# Patient Record
Sex: FEMALE | Race: WHITE | NOT HISPANIC OR LATINO | Employment: FULL TIME | URBAN - METROPOLITAN AREA
[De-identification: names, ages, dates, MRNs, and addresses within clinical notes are randomized per-mention and may not be internally consistent; named-entity substitution may affect disease eponyms.]

---

## 2021-03-12 ENCOUNTER — IMMUNIZATION (OUTPATIENT)
Dept: PRIMARY CARE CLINIC | Facility: CLINIC | Age: 21
End: 2021-03-12

## 2021-03-12 DIAGNOSIS — Z23 NEED FOR VACCINATION: Primary | ICD-10-CM

## 2021-03-12 PROCEDURE — 0001A PR IMMUNIZ ADMIN, SARS-COV-2 COVID-19 VACC, 30MCG/0.3ML, 1ST DOSE: CPT | Mod: CV19,S$GLB,, | Performed by: INTERNAL MEDICINE

## 2021-03-12 PROCEDURE — 0001A PR IMMUNIZ ADMIN, SARS-COV-2 COVID-19 VACC, 30MCG/0.3ML, 1ST DOSE: ICD-10-PCS | Mod: CV19,S$GLB,, | Performed by: INTERNAL MEDICINE

## 2021-03-12 PROCEDURE — 91300 PR SARS-COV- 2 COVID-19 VACCINE, NO PRSV, 30MCG/0.3ML, IM: ICD-10-PCS | Mod: S$GLB,,, | Performed by: INTERNAL MEDICINE

## 2021-03-12 PROCEDURE — 91300 PR SARS-COV- 2 COVID-19 VACCINE, NO PRSV, 30MCG/0.3ML, IM: CPT | Mod: S$GLB,,, | Performed by: INTERNAL MEDICINE

## 2021-03-12 RX ADMIN — Medication 0.3 ML: at 01:03

## 2021-04-02 ENCOUNTER — IMMUNIZATION (OUTPATIENT)
Dept: PRIMARY CARE CLINIC | Facility: CLINIC | Age: 21
End: 2021-04-02

## 2021-04-02 DIAGNOSIS — Z23 NEED FOR VACCINATION: Primary | ICD-10-CM

## 2021-04-02 PROCEDURE — 0002A PR IMMUNIZ ADMIN, SARS-COV-2 COVID-19 VACC, 30MCG/0.3ML, 2ND DOSE: CPT | Mod: CV19,S$GLB,, | Performed by: INTERNAL MEDICINE

## 2021-04-02 PROCEDURE — 0002A PR IMMUNIZ ADMIN, SARS-COV-2 COVID-19 VACC, 30MCG/0.3ML, 2ND DOSE: ICD-10-PCS | Mod: CV19,S$GLB,, | Performed by: INTERNAL MEDICINE

## 2021-04-02 PROCEDURE — 91300 PR SARS-COV- 2 COVID-19 VACCINE, NO PRSV, 30MCG/0.3ML, IM: ICD-10-PCS | Mod: S$GLB,,, | Performed by: INTERNAL MEDICINE

## 2021-04-02 PROCEDURE — 91300 PR SARS-COV- 2 COVID-19 VACCINE, NO PRSV, 30MCG/0.3ML, IM: CPT | Mod: S$GLB,,, | Performed by: INTERNAL MEDICINE

## 2021-04-02 RX ADMIN — Medication 0.3 ML: at 02:04

## 2023-03-07 ENCOUNTER — OFFICE VISIT (OUTPATIENT)
Dept: OBSTETRICS AND GYNECOLOGY | Facility: CLINIC | Age: 23
End: 2023-03-07
Payer: COMMERCIAL

## 2023-03-07 VITALS — DIASTOLIC BLOOD PRESSURE: 70 MMHG | SYSTOLIC BLOOD PRESSURE: 107 MMHG | WEIGHT: 107.38 LBS

## 2023-03-07 DIAGNOSIS — N39.0 RECURRENT UTI: ICD-10-CM

## 2023-03-07 DIAGNOSIS — Z11.3 SCREEN FOR STD (SEXUALLY TRANSMITTED DISEASE): ICD-10-CM

## 2023-03-07 DIAGNOSIS — R30.0 DYSURIA: Primary | ICD-10-CM

## 2023-03-07 PROCEDURE — 87086 URINE CULTURE/COLONY COUNT: CPT | Performed by: STUDENT IN AN ORGANIZED HEALTH CARE EDUCATION/TRAINING PROGRAM

## 2023-03-07 PROCEDURE — 99203 PR OFFICE/OUTPT VISIT, NEW, LEVL III, 30-44 MIN: ICD-10-PCS | Mod: S$GLB,,, | Performed by: STUDENT IN AN ORGANIZED HEALTH CARE EDUCATION/TRAINING PROGRAM

## 2023-03-07 PROCEDURE — 3078F PR MOST RECENT DIASTOLIC BLOOD PRESSURE < 80 MM HG: ICD-10-PCS | Mod: CPTII,S$GLB,, | Performed by: STUDENT IN AN ORGANIZED HEALTH CARE EDUCATION/TRAINING PROGRAM

## 2023-03-07 PROCEDURE — 81514 NFCT DS BV&VAGINITIS DNA ALG: CPT | Performed by: STUDENT IN AN ORGANIZED HEALTH CARE EDUCATION/TRAINING PROGRAM

## 2023-03-07 PROCEDURE — 99203 OFFICE O/P NEW LOW 30 MIN: CPT | Mod: S$GLB,,, | Performed by: STUDENT IN AN ORGANIZED HEALTH CARE EDUCATION/TRAINING PROGRAM

## 2023-03-07 PROCEDURE — 3074F SYST BP LT 130 MM HG: CPT | Mod: CPTII,S$GLB,, | Performed by: STUDENT IN AN ORGANIZED HEALTH CARE EDUCATION/TRAINING PROGRAM

## 2023-03-07 PROCEDURE — 99999 PR PBB SHADOW E&M-EST. PATIENT-LVL III: CPT | Mod: PBBFAC,,, | Performed by: STUDENT IN AN ORGANIZED HEALTH CARE EDUCATION/TRAINING PROGRAM

## 2023-03-07 PROCEDURE — 99999 PR PBB SHADOW E&M-EST. PATIENT-LVL III: ICD-10-PCS | Mod: PBBFAC,,, | Performed by: STUDENT IN AN ORGANIZED HEALTH CARE EDUCATION/TRAINING PROGRAM

## 2023-03-07 PROCEDURE — 3078F DIAST BP <80 MM HG: CPT | Mod: CPTII,S$GLB,, | Performed by: STUDENT IN AN ORGANIZED HEALTH CARE EDUCATION/TRAINING PROGRAM

## 2023-03-07 PROCEDURE — 1160F RVW MEDS BY RX/DR IN RCRD: CPT | Mod: CPTII,S$GLB,, | Performed by: STUDENT IN AN ORGANIZED HEALTH CARE EDUCATION/TRAINING PROGRAM

## 2023-03-07 PROCEDURE — 1159F PR MEDICATION LIST DOCUMENTED IN MEDICAL RECORD: ICD-10-PCS | Mod: CPTII,S$GLB,, | Performed by: STUDENT IN AN ORGANIZED HEALTH CARE EDUCATION/TRAINING PROGRAM

## 2023-03-07 PROCEDURE — 1159F MED LIST DOCD IN RCRD: CPT | Mod: CPTII,S$GLB,, | Performed by: STUDENT IN AN ORGANIZED HEALTH CARE EDUCATION/TRAINING PROGRAM

## 2023-03-07 PROCEDURE — 87591 N.GONORRHOEAE DNA AMP PROB: CPT | Performed by: STUDENT IN AN ORGANIZED HEALTH CARE EDUCATION/TRAINING PROGRAM

## 2023-03-07 PROCEDURE — 3074F PR MOST RECENT SYSTOLIC BLOOD PRESSURE < 130 MM HG: ICD-10-PCS | Mod: CPTII,S$GLB,, | Performed by: STUDENT IN AN ORGANIZED HEALTH CARE EDUCATION/TRAINING PROGRAM

## 2023-03-07 PROCEDURE — 1160F PR REVIEW ALL MEDS BY PRESCRIBER/CLIN PHARMACIST DOCUMENTED: ICD-10-PCS | Mod: CPTII,S$GLB,, | Performed by: STUDENT IN AN ORGANIZED HEALTH CARE EDUCATION/TRAINING PROGRAM

## 2023-03-07 RX ORDER — ALBUTEROL SULFATE 0.63 MG/3ML
0.63 SOLUTION RESPIRATORY (INHALATION) EVERY 6 HOURS PRN
COMMUNITY

## 2023-03-07 RX ORDER — CITALOPRAM 10 MG/1
10 TABLET ORAL DAILY
COMMUNITY
End: 2024-03-13 | Stop reason: ALTCHOICE

## 2023-03-09 LAB
BACTERIA UR CULT: NO GROWTH
BACTERIAL VAGINOSIS DNA: NEGATIVE
CANDIDA GLABRATA DNA: NEGATIVE
CANDIDA KRUSEI DNA: NEGATIVE
CANDIDA RRNA VAG QL PROBE: POSITIVE
T VAGINALIS RRNA GENITAL QL PROBE: NEGATIVE

## 2023-03-10 LAB
C TRACH DNA SPEC QL NAA+PROBE: NOT DETECTED
N GONORRHOEA DNA SPEC QL NAA+PROBE: NOT DETECTED

## 2023-03-12 NOTE — PROGRESS NOTES
History & Physical  Gynecology      SUBJECTIVE:     Chief Complaint: Urinary Tract Infection       History of Present Illness:  Heidi is a 22 yr old G0 who presents to clinic for evaluation of recurrent UTIs, dysuria and STD screen. 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 According to the patient she has been treated multiple times for UTIs and what she describes as ureaplasma infections. She states that she usually feel better following treatment but then again experiences symptoms of dysuria following sexual intercourse. She states she has had multiple sexual partners and does not always use protection. She requests STD screening today.     Review of patient's allergies indicates:  No Known Allergies    Past Medical History:   Diagnosis Date    Asthma     Mental disorder      Past Surgical History:   Procedure Laterality Date    WISDOM TOOTH EXTRACTION       OB History          0    Para   0    Term   0       0    AB   0    Living   0         SAB   0    IAB   0    Ectopic   0    Multiple   0    Live Births   0               Family History   Problem Relation Age of Onset    Breast cancer Mother     Breast cancer Maternal Aunt      Social History     Tobacco Use    Smoking status: Some Days     Types: Vaping with nicotine     Start date: 3/8/2022    Smokeless tobacco: Current   Substance Use Topics    Alcohol use: Yes     Alcohol/week: 6.0 standard drinks     Types: 6 Drinks containing 0.5 oz of alcohol per week    Drug use: Never       Current Outpatient Medications   Medication Sig    albuterol (ACCUNEB) 0.63 mg/3 mL Nebu Take 0.63 mg by nebulization every 6 (six) hours as needed. Rescue    ciclesonide (ALVESCO) 160 mcg/actuation HFAA Inhale 1 puff into the lungs. Controller    citalopram (CELEXA) 10 MG tablet Take 10 mg by mouth once daily. Takes 2 10s     No current facility-administered  medications for this visit.       Review of Systems:  Review of Systems   Constitutional:  Negative for chills, fatigue and fever.   HENT:  Negative for congestion.    Eyes:  Negative for visual disturbance.   Respiratory:  Negative for cough and shortness of breath.    Cardiovascular:  Negative for chest pain and palpitations.   Gastrointestinal:  Negative for abdominal distention, abdominal pain, constipation, diarrhea, nausea and vomiting.   Genitourinary:  Positive for dysuria. Negative for difficulty urinating, hematuria, vaginal bleeding and vaginal discharge.   Skin:  Negative for rash.   Neurological:  Negative for dizziness, seizures, light-headedness and headaches.   Hematological:  Does not bruise/bleed easily.   Psychiatric/Behavioral:  Negative for dysphoric mood. The patient is not nervous/anxious.       OBJECTIVE:     Physical Exam:  Vitals:    03/07/23 1128   BP: 107/70      Physical Exam  Vitals and nursing note reviewed. Exam conducted with a chaperone present.   Constitutional:       General: She is not in acute distress.     Appearance: She is well-developed.   HENT:      Head: Normocephalic and atraumatic.   Eyes:      Pupils: Pupils are equal, round, and reactive to light.   Cardiovascular:      Rate and Rhythm: Normal rate and regular rhythm.   Pulmonary:      Effort: Pulmonary effort is normal. No respiratory distress.   Abdominal:      General: There is no distension.      Palpations: Abdomen is soft. There is no mass.      Tenderness: There is no abdominal tenderness. There is no guarding.   Genitourinary:     Comments: SSE: Normal external female genitalia, normal urethral meatus, normal vaginal rugae, normal vaginal mucosa, no vaginal blood in canal, normal physiologic discharge, normal cervix, no adnexal masses palpated on bimanual.     Musculoskeletal:         General: Normal range of motion.      Cervical back: Normal range of motion and neck supple.   Skin:     General: Skin is warm  and dry.   Neurological:      Mental Status: She is alert and oriented to person, place, and time.   Psychiatric:         Behavior: Behavior normal.         Thought Content: Thought content normal.         Judgment: Judgment normal.       ASSESSMENT/PLAN:     1. Dysuria  2. Recurrent UTI  - Urine Dip in clinic unremarkable  - Will collect culture and send for analysis, Ureaplasma sent as well  - If UTI confirmed, consider either post-coital UTI PPX or weekly UTI PPX  - Urine culture  - Ureaplasma PCR Urine; Future    3. Screen for STD (sexually transmitted disease)  - Discussed importance of safe sexual practices  - Reassuring clinical exam  - C. trachomatis/N. gonorrhoeae by AMP DNA  - Vaginosis Screen by DNA Probe    Manny Aquino M.D.  OB/GYN  [facility]

## 2023-03-14 RX ORDER — FLUCONAZOLE 150 MG/1
150 TABLET ORAL DAILY
Qty: 1 TABLET | Refills: 0 | Status: SHIPPED | OUTPATIENT
Start: 2023-03-14 | End: 2023-03-15

## 2023-03-15 ENCOUNTER — PATIENT MESSAGE (OUTPATIENT)
Dept: OBSTETRICS AND GYNECOLOGY | Facility: CLINIC | Age: 23
End: 2023-03-15
Payer: COMMERCIAL

## 2023-05-12 ENCOUNTER — OFFICE VISIT (OUTPATIENT)
Dept: DERMATOLOGY | Facility: CLINIC | Age: 23
End: 2023-05-12
Payer: COMMERCIAL

## 2023-05-12 DIAGNOSIS — L70.0 ACNE VULGARIS: Primary | ICD-10-CM

## 2023-05-12 DIAGNOSIS — D48.5 NEOPLASM OF UNCERTAIN BEHAVIOR OF SKIN: ICD-10-CM

## 2023-05-12 DIAGNOSIS — D22.9 MULTIPLE BENIGN NEVI: ICD-10-CM

## 2023-05-12 PROCEDURE — 88342 IMHCHEM/IMCYTCHM 1ST ANTB: CPT | Mod: 26,,, | Performed by: DERMATOLOGY

## 2023-05-12 PROCEDURE — 88305 TISSUE EXAM BY PATHOLOGIST: CPT | Performed by: DERMATOLOGY

## 2023-05-12 PROCEDURE — 99204 PR OFFICE/OUTPT VISIT, NEW, LEVL IV, 45-59 MIN: ICD-10-PCS | Mod: 25,S$GLB,, | Performed by: DERMATOLOGY

## 2023-05-12 PROCEDURE — 88342 IMHCHEM/IMCYTCHM 1ST ANTB: CPT | Performed by: DERMATOLOGY

## 2023-05-12 PROCEDURE — 11102 TANGNTL BX SKIN SINGLE LES: CPT | Mod: S$GLB,,, | Performed by: DERMATOLOGY

## 2023-05-12 PROCEDURE — 88305 TISSUE EXAM BY PATHOLOGIST: CPT | Mod: 26,,, | Performed by: DERMATOLOGY

## 2023-05-12 PROCEDURE — 88305 TISSUE EXAM BY PATHOLOGIST: ICD-10-PCS | Mod: 26,,, | Performed by: DERMATOLOGY

## 2023-05-12 PROCEDURE — 88342 CHG IMMUNOCYTOCHEMISTRY: ICD-10-PCS | Mod: 26,,, | Performed by: DERMATOLOGY

## 2023-05-12 PROCEDURE — 11102 PR TANGENTIAL BIOPSY, SKIN, SINGLE LESION: ICD-10-PCS | Mod: S$GLB,,, | Performed by: DERMATOLOGY

## 2023-05-12 PROCEDURE — 99999 PR PBB SHADOW E&M-EST. PATIENT-LVL III: ICD-10-PCS | Mod: PBBFAC,,, | Performed by: DERMATOLOGY

## 2023-05-12 PROCEDURE — 11102 TANGNTL BX SKIN SINGLE LES: CPT | Mod: PBBFAC | Performed by: DERMATOLOGY

## 2023-05-12 PROCEDURE — 99999 PR PBB SHADOW E&M-EST. PATIENT-LVL III: CPT | Mod: PBBFAC,,, | Performed by: DERMATOLOGY

## 2023-05-12 PROCEDURE — 99204 OFFICE O/P NEW MOD 45 MIN: CPT | Mod: 25,S$GLB,, | Performed by: DERMATOLOGY

## 2023-05-12 RX ORDER — CLINDAMYCIN PHOSPHATE 10 MG/G
GEL TOPICAL
Qty: 60 G | Refills: 2 | Status: SHIPPED | OUTPATIENT
Start: 2023-05-12

## 2023-05-12 RX ORDER — TRETINOIN 0.25 MG/G
CREAM TOPICAL
Qty: 45 G | Refills: 5 | Status: SHIPPED | OUTPATIENT
Start: 2023-05-12

## 2023-05-12 NOTE — PROGRESS NOTES
Subjective:      Patient ID:  Heidi Gonzalez is a 22 y.o. female who presents for   Chief Complaint   Patient presents with    Skin Check     TBSE    Lesion     Nose     22F presents for evaluation of acne, a concerning lesion on her nose, and a concerning lesion on her L forearm.      Patient reports intermittent flares of acne on her chin.  Used Epiduo in the past, but didn't like it 2/2 irritation and peeling.  She is using a facial pad cleanser on her face.  Does not notice association with her menstrual cycle.  Has an IUD in place.     Patient also noticed a red patch on the tip of her nose a few months ago.  Doesn't hurt, itch, or bother her.     Patient also noticed a mole on her L forearm, which has gotten darker.  It has not grown, bled, ulcerated.  She wears sunscreen if she will be out in the sun, but does not wear sunscreen everyday.      Lesion    Patient here for Total Body Skin Exam    Last seen by dermatologist: N/A    none - personal history of atypical moles removed  none - personal history of MM   Maternal grandmother - family history of MM  none - childhood blistering sunburns  none - tanning bed use  none - personal history of NMSC    Patient with specific complaint of lesion(s)  Location: nose  Duration: 4 months  Symptoms: redness  Relieving factors/Previous treatments: none    Review of Systems   Constitutional:  Negative for fever, chills, weight loss, fatigue and night sweats.   Skin:  Positive for activity-related sunscreen use and recent sunburn. Negative for daily sunscreen use.     Objective:   Physical Exam   Constitutional: She appears well-developed and well-nourished. No distress.   Neurological: She is alert and oriented to person, place, and time. She is not disoriented.   Psychiatric: She has a normal mood and affect.   Skin:   Areas Examined (abnormalities noted in diagram):   Scalp / Hair Palpated and Inspected  Head / Face Inspection Performed  Neck Inspection  Performed  Chest / Axilla Inspection Performed  Abdomen Inspection Performed  Genitals / Buttocks / Groin Inspection Performed  Back Inspection Performed  RUE Inspected  LUE Inspection Performed  RLE Inspected  LLE Inspection Performed  Nails and Digits Inspection Performed                     Diagram Legend     Erythematous scaling macule/papule c/w actinic keratosis       Vascular papule c/w angioma      Pigmented verrucoid papule/plaque c/w seborrheic keratosis      Yellow umbilicated papule c/w sebaceous hyperplasia      Irregularly shaped tan macule c/w lentigo     1-2 mm smooth white papules consistent with Milia      Movable subcutaneous cyst with punctum c/w epidermal inclusion cyst      Subcutaneous movable cyst c/w pilar cyst      Firm pink to brown papule c/w dermatofibroma      Pedunculated fleshy papule(s) c/w skin tag(s)      Evenly pigmented macule c/w junctional nevus     Mildly variegated pigmented, slightly irregular-bordered macule c/w mildly atypical nevus      Flesh colored to evenly pigmented papule c/w intradermal nevus       Pink pearly papule/plaque c/w basal cell carcinoma      Erythematous hyperkeratotic cursted plaque c/w SCC      Surgical scar with no sign of skin cancer recurrence      Open and closed comedones      Inflammatory papules and pustules      Verrucoid papule consistent consistent with wart     Erythematous eczematous patches and plaques     Dystrophic onycholytic nail with subungual debris c/w onychomycosis     Umbilicated papule    Erythematous-base heme-crusted tan verrucoid plaque consistent with inflamed seborrheic keratosis     Erythematous Silvery Scaling Plaque c/w Psoriasis     See annotation      Assessment / Plan:    Neoplasm of uncertain behavior of skin  Shave biopsy procedure note:    Shave biopsy performed after verbal consent including risk of infection, scar, recurrence, need for additional treatment of site. Area prepped with alcohol, anesthetized with  approximately 1.0cc of 1% lidocaine with epinephrine. Lesional tissue shaved with razor blade. Hemostasis achieved with application of aluminum chloride followed by hyfrecation. No complications. Dressing applied. Wound care explained.    -     Specimen to Pathology, Dermatology  Pathology Orders:       Normal Orders This Visit    Specimen to Pathology, Dermatology     Comments:    Number of Specimens:->1  ------------------------->-------------------------  Spec 1 Procedure:->Biopsy  Spec 1 Clinical Impression:->r/o atypical nevus vs MM vs  other  Spec 1 Source:->L forearm    Questions:    Procedure Type: Dermatology and skin neoplasms    Number of Specimens: 1    ------------------------: -------------------------    Spec 1 Procedure: Biopsy    Spec 1 Clinical Impression: r/o atypical nevus vs MM vs other    Spec 1 Source: L forearm    Release to patient:           Acne vulgaris  -     tretinoin (RETIN-A) 0.025 % cream; Apply pea sized amount to entire face nightly. Wash off in am and apply sunscreen.  Dispense: 45 g; Refill: 5  -     clindamycin phosphate 1% (CLINDAGEL) 1 % gel; Apply to affected areas of acne on face twice a day after cleansing.  Dispense: 60 g; Refill: 2  Counseled pt that the retinoid may make the skin dry, red, and irritated. May moisturize daily with a non-comedogenic moisturizer. If still dry, would recommend using the retinoid every other night or less frequently as tolerated. Avoid using around corners of eyes, nose, and mouth.  Patient instructed in importance of daily broad spectrum sunscreen use with spf at least 30. Sun avoidance and topical protection/protective clothing discussed.    Multiple benign nevi  Benign-appearing nevi present on exam today. Reassurance provided. Periodically examine moles and return to clinic if any moles change or become symptomatic (bleeding, itching, pain, etc).    Will re-check red, scaly area on nose after using topical retinoid in 3 months. Encouraged  not picking/scratching the area.    Skin cancer screening  Total body skin examination performed today including at least 12 points as noted in physical examination. Suspicious lesions noted above.  Patient instructed in importance of daily broad spectrum sunscreen use with spf at least 30. Sun avoidance and topical protection/protective clothing discussed.    Follow up in about 3 months (around 8/12/2023) for skin check or sooner for any concerns.

## 2023-05-12 NOTE — PATIENT INSTRUCTIONS
Sun Protection      The [facility] would like to remind you of the importance of sun protection all year round and particularly during the summer when the suns rays are the strongest. It has been proven that both acute and chronic sun exposure damages our cells and leads to skin cancer. Beyond skin cancer, the sun causes 90% of the symptoms of premature skin aging, including wrinkles, lentigines (brown spots), and thin, easily bruised skin. Proper sun protection can help prevent these unwanted conditions.    Many patients report that they dont go in the sun. It has been shown that the average person receives 18 hours of incidental sun exposure per week during activities such as walking through parking lots, driving, or sitting next to windows. This accumulates to several bad sunburns per year!    In choosing sunscreen, you want one that protects against both UVA and UVB rays (broad spectrum). It is recommended that you use one of SPF 30 or higher. It is important to apply the sunscreen about 20 minutes prior to sun exposure. Most sunscreens are chemical sunscreens and a reaction must take place in the skin so that they are effective. If they are applied and then you are immediately exposed to the sun or start sweating, this reaction has not had time to take place and you are therefore unprotected. Sunscreen needs to be reapplied every 2 hours if you are participating in water sports or sweating. We recommend Elta MD or CeraVe sunscreens for daily use; however there are many options and it is most important for you to find one that you will use on a consistent basis.    If you have sensitive skin, you may do best with a sunscreen that contains only physical blockers in the active ingredient section. The only physical blockers available in the [place] currently are titanium dioxide or zinc oxide. These are typically thicker and harder to apply, however they afford very good protection.  Neutrogena Sensitive Skin, Blue Lizard Sensitive Skin (pink top) or Neutrogena Pure and Free are popular ones.     Aside from sunscreen, clothes with UV protection (UPF), wide brimmed hats, and sunglasses are other means of sun protection that we recommend.      Based on a recent study (6/2021) and out of an abundance of caution, we are recommending that you AVOID the following sunscreens as they may contain the carcinogen, benzene:    Spray and gel sunscreens  Any [facility] or [facility] brands as well as Max Block and TopCare brands   Neutrogena Ultra Sheer Dry-touch Water Resistant Sunscreen LOTION SPF 70   Neutrogena Sheer Zinc Dry-touch Face Sunscreen LOTION SPF 50   5.   Aveeno Baby Continuous Protection Sensitive Skin Sunscreen LOTION - Broad Spectrum SPF 50    Please note that Benzene is not an ingredient or the degradation product of any ingredient in any sunscreen. This study suggested that the findings are a result of contamination in the manufacturing process. At this point, we don't know how effectively Benzene gets through the skin, if it gets absorbed systemically, and what effects it may have.     We do know that ultraviolet radiation is a well-established carcinogen. Please use daily sun protection/avoidance and use of at least SPF 30, broad-spectrum sunscreen not listed above.                       [facility] - DERMATOLOGY [address]  [place] [address]  Dept: [phone]  Dept Fax: [phone]          Shave Biopsy Wound Care    Your doctor has performed a shave biopsy today.  A band aid and vaseline ointment has been placed over the site.  This should remain in place for NO LONGER THAN 48 hours.  It is fine to remove the bandaid after 24 hours, if the area is no longer bleeding. It is recommended that you keep the area dry (do not wet)) for the first 24 hours.  After 24 hours, wash the area with warm soap and water and apply Vaseline jelly.  Many  patients prefer to use Neosporin or Bacitracin ointment.  This is acceptable; however, know that you can develop an allergy to this medication even if you have used it safely for years.  It is important to keep the area moist.  Letting it dry out and get air slows healing time, and will worsen the scar.        If you notice increasing redness, tenderness, pain, or yellow drainage at the biopsy site, please notify your doctor.  These are signs of an infection.    If your biopsy site is bleeding, apply firm pressure for 15 minutes straight.  Repeat for another 15 minutes, if it is still bleeding.   If the surgical site continues to bleed, then please contact your doctor.      For [facility] users:   You will receive your biopsy results in [facility] as soon as they are available. Please be assured that your physician/provider will review your results and will then determine what further treatment, evaluation, or planning is required. You should be contacted by your physician's/provider's office within 5 business days of receiving your results; If not, please reach out to directly. This is one more way [facility] is putting you first.     [facility] [place] [address]/ [phone] FAX/ [email or web address]     RETINOIDS           Your doctor has prescribed a topical retinoid for your skin. A retinoid is a vitamin A derived product used to treat a variety of skin conditions including acne, actinic keratoses (pre-skin cancers), uneven pigmentation from sun damage, fine lines and wrinkles, and enlarged pores.    How do they work?         Retinoids increase skin cell turn over from the normal 30 days to five or six days, minimizing clogged pores-the major factor in acne. Retinoids can also repair the DNA in cells damaged by the sun helping to even out skin pigmentation and clear pre-skin cancers. They can shrink oil glands and minimize the appearance of large pores. These effects can not be  appreciated unless the medication is used on a consistent basis!    How do I use a retinoid?         After washing with a mild cleanser (Purpose, Aqua glycolic face cleanser, Cetaphil, Neutrogena deep cream cleanser), the skin should be moisturized with a non-retinol containing moisturizer such as Cerave PM. Then a thin layer of medication is applied to the forehead, nose cheeks, and chin (and around eyes if treating fine lines and wrinkles) at night. The amount of medication needed to cover the entire face should be no more than the size of a green pea. Irritation around the eyes can be treated with Vaseline at night.    What if my skin appears dry, red, and is peeling?          Retinoids do not cause dry skin but rather they cause the top layer of the skin the shed, giving an appearance of dry skin. In fact, new healthy skin cells are replacing older, damaged cells on the surface. This usually occurs the first 2-4 weeks as the skin is adjusting to the medication. It is reasonable to use the medication every other night or even every 2 nights until your skin adjusts. You can use a MILD exfoliant to remove the peeling skin (Aveeno daily clarifying pads, Aqua glycolic face cream) and can apply a moisturizer throughout the day as needed. Retinoids come in a variety of strengths and vehicles and your doctor can find one best for you. If you cannot tolerate prescription strength retinoids, over the counter products with retinol may be beneficial. (Olay ProX wrinkle cream, JOSE L deep wrinkle cream, Green Cream at [facility])    Will my skin be more sensitive in the sun?           You will need to use sunscreen with SPF 30 daily. Retinoids will cause the outermost layer of the skin to be thinner and thus more sensitive to ultraviolet rays. However, remember that over time, retinoids actually make the skin thicker by enhancing collagen deposition which protects the skin from sun damage.    When will I see results?             If you are using a retinoid for acne, you should see improvement in 6-8 weeks. Do not be alarmed if you find that your acne gets worse before it gets better-KEEP USING THE MEDICATION- this is a normal response and your acne will improve if you can stick with it.           If you are using the medication for anti-aging and skin dyspigmentation, you may see results in 3 months, but most effects are not visible until 6 months. Retinoids are clinically proven to reverse signs of aging, but only if used on a CONSTISTENT BASIS!             Remember that retinoids should not be used if you are pregnant.           Discontinue use 1 week prior to waxing, as skin is more likely to tear.

## 2023-05-22 LAB
FINAL PATHOLOGIC DIAGNOSIS: NORMAL
GROSS: NORMAL
Lab: NORMAL
MICROSCOPIC EXAM: NORMAL

## 2023-07-13 ENCOUNTER — OFFICE VISIT (OUTPATIENT)
Dept: DERMATOLOGY | Facility: CLINIC | Age: 23
End: 2023-07-13
Payer: COMMERCIAL

## 2023-07-13 VITALS — WEIGHT: 107 LBS

## 2023-07-13 DIAGNOSIS — L30.9 DERMATITIS: Primary | ICD-10-CM

## 2023-07-13 PROCEDURE — 1160F PR REVIEW ALL MEDS BY PRESCRIBER/CLIN PHARMACIST DOCUMENTED: ICD-10-PCS | Mod: CPTII,S$GLB,, | Performed by: DERMATOLOGY

## 2023-07-13 PROCEDURE — 99999 PR PBB SHADOW E&M-EST. PATIENT-LVL III: ICD-10-PCS | Mod: PBBFAC,,, | Performed by: DERMATOLOGY

## 2023-07-13 PROCEDURE — 1159F PR MEDICATION LIST DOCUMENTED IN MEDICAL RECORD: ICD-10-PCS | Mod: CPTII,S$GLB,, | Performed by: DERMATOLOGY

## 2023-07-13 PROCEDURE — 99214 OFFICE O/P EST MOD 30 MIN: CPT | Mod: S$GLB,,, | Performed by: DERMATOLOGY

## 2023-07-13 PROCEDURE — 1160F RVW MEDS BY RX/DR IN RCRD: CPT | Mod: CPTII,S$GLB,, | Performed by: DERMATOLOGY

## 2023-07-13 PROCEDURE — 99214 PR OFFICE/OUTPT VISIT, EST, LEVL IV, 30-39 MIN: ICD-10-PCS | Mod: S$GLB,,, | Performed by: DERMATOLOGY

## 2023-07-13 PROCEDURE — 99999 PR PBB SHADOW E&M-EST. PATIENT-LVL III: CPT | Mod: PBBFAC,,, | Performed by: DERMATOLOGY

## 2023-07-13 PROCEDURE — 1159F MED LIST DOCD IN RCRD: CPT | Mod: CPTII,S$GLB,, | Performed by: DERMATOLOGY

## 2023-07-13 RX ORDER — PREDNISONE 20 MG/1
20 TABLET ORAL DAILY
Qty: 15 TABLET | Refills: 0 | Status: SHIPPED | OUTPATIENT
Start: 2023-07-13 | End: 2023-07-28

## 2023-07-13 RX ORDER — TACROLIMUS 1 MG/G
OINTMENT TOPICAL 2 TIMES DAILY
Qty: 30 G | Refills: 0 | Status: SHIPPED | OUTPATIENT
Start: 2023-07-13

## 2023-07-13 RX ORDER — PERMETHRIN 50 MG/G
CREAM TOPICAL ONCE
Qty: 60 G | Refills: 0 | Status: SHIPPED | OUTPATIENT
Start: 2023-07-13 | End: 2023-07-13

## 2023-07-13 RX ORDER — TRIAMCINOLONE ACETONIDE 1 MG/G
CREAM TOPICAL
Qty: 454 G | Refills: 3 | Status: SHIPPED | OUTPATIENT
Start: 2023-07-13

## 2023-07-13 NOTE — PROGRESS NOTES
Subjective:      Patient ID:  Heidi Gonzalez is a 22 y.o. female who presents for   Chief Complaint   Patient presents with    Itching     Bilateral ankles, wrists, several weeks     Several weeks of pruritic lesions on wrists, ankles and abdomen.  Had a friend with scabies.  She also has + hx allergies/hay fever.    Itching - Initial  Affected locations: abdomen, left lower leg, right lower leg, left wrist and right wrist  Signs / symptoms: itching    Review of Systems   Constitutional:  Negative for fever, chills, weight loss, weight gain, fatigue, night sweats and malaise.   Skin:  Positive for itching and activity-related sunscreen use. Negative for daily sunscreen use and wears hat.   Hematologic/Lymphatic: Bruises/bleeds easily.     Objective:   Physical Exam   Constitutional: She appears well-developed and well-nourished.   Neurological: She is alert and oriented to person, place, and time.   Psychiatric: She has a normal mood and affect.   Skin:   Areas Examined (abnormalities noted in diagram):   Abdomen Inspection Performed  RLE Inspected  LLE Inspection Performed  Nails and Digits Inspection Performed          Diagram Legend     Erythematous scaling macule/papule c/w actinic keratosis       Vascular papule c/w angioma      Pigmented verrucoid papule/plaque c/w seborrheic keratosis      Yellow umbilicated papule c/w sebaceous hyperplasia      Irregularly shaped tan macule c/w lentigo     1-2 mm smooth white papules consistent with Milia      Movable subcutaneous cyst with punctum c/w epidermal inclusion cyst      Subcutaneous movable cyst c/w pilar cyst      Firm pink to brown papule c/w dermatofibroma      Pedunculated fleshy papule(s) c/w skin tag(s)      Evenly pigmented macule c/w junctional nevus     Mildly variegated pigmented, slightly irregular-bordered macule c/w mildly atypical nevus      Flesh colored to evenly pigmented papule c/w intradermal nevus       Pink pearly papule/plaque c/w  basal cell carcinoma      Erythematous hyperkeratotic cursted plaque c/w SCC      Surgical scar with no sign of skin cancer recurrence      Open and closed comedones      Inflammatory papules and pustules      Verrucoid papule consistent consistent with wart     Erythematous eczematous patches and plaques     Dystrophic onycholytic nail with subungual debris c/w onychomycosis     Umbilicated papule    Erythematous-base heme-crusted tan verrucoid plaque consistent with inflamed seborrheic keratosis     Erythematous Silvery Scaling Plaque c/w Psoriasis     See annotation      Assessment / Plan:        Dermatitis, likely scabies by hx   (+hx atopy)  -     permethrin (ELIMITE) 5 % cream; Apply topically once. for 1 dose  Dispense: 60 g; Refill: 0  -     triamcinolone acetonide 0.1% (KENALOG) 0.1 % cream; Use bid  Dispense: 454 g; Refill: 3    No hot showers  Cetaphil restoraderm lotion              Follow up if symptoms worsen or fail to improve.

## 2023-08-22 ENCOUNTER — OFFICE VISIT (OUTPATIENT)
Dept: OBSTETRICS AND GYNECOLOGY | Facility: CLINIC | Age: 23
End: 2023-08-22
Payer: COMMERCIAL

## 2023-08-22 VITALS
SYSTOLIC BLOOD PRESSURE: 106 MMHG | HEIGHT: 63 IN | WEIGHT: 119.5 LBS | BODY MASS INDEX: 21.17 KG/M2 | DIASTOLIC BLOOD PRESSURE: 71 MMHG

## 2023-08-22 DIAGNOSIS — N39.0 RECURRENT UTI: ICD-10-CM

## 2023-08-22 DIAGNOSIS — N89.8 VAGINAL DISCHARGE: ICD-10-CM

## 2023-08-22 DIAGNOSIS — Z80.3 FAMILY HISTORY OF BREAST CANCER IN MOTHER: ICD-10-CM

## 2023-08-22 DIAGNOSIS — Z20.2 STD EXPOSURE: ICD-10-CM

## 2023-08-22 DIAGNOSIS — R30.0 DYSURIA: ICD-10-CM

## 2023-08-22 DIAGNOSIS — Z01.419 ENCOUNTER FOR ANNUAL ROUTINE GYNECOLOGICAL EXAMINATION: Primary | ICD-10-CM

## 2023-08-22 LAB
BILIRUB SERPL-MCNC: ABNORMAL MG/DL
BLOOD URINE, POC: ABNORMAL
CLARITY, POC UA: ABNORMAL
COLOR, POC UA: YELLOW
GLUCOSE UR QL STRIP: ABNORMAL
KETONES UR QL STRIP: ABNORMAL
LEUKOCYTE ESTERASE URINE, POC: ABNORMAL
NITRITE, POC UA: ABNORMAL
PH, POC UA: 8
PROTEIN, POC: ABNORMAL
SPECIFIC GRAVITY, POC UA: 1.01
UROBILINOGEN, POC UA: ABNORMAL

## 2023-08-22 PROCEDURE — 3074F SYST BP LT 130 MM HG: CPT | Mod: CPTII,S$GLB,, | Performed by: OBSTETRICS & GYNECOLOGY

## 2023-08-22 PROCEDURE — 1159F PR MEDICATION LIST DOCUMENTED IN MEDICAL RECORD: ICD-10-PCS | Mod: CPTII,S$GLB,, | Performed by: OBSTETRICS & GYNECOLOGY

## 2023-08-22 PROCEDURE — 1160F PR REVIEW ALL MEDS BY PRESCRIBER/CLIN PHARMACIST DOCUMENTED: ICD-10-PCS | Mod: CPTII,S$GLB,, | Performed by: OBSTETRICS & GYNECOLOGY

## 2023-08-22 PROCEDURE — 3008F BODY MASS INDEX DOCD: CPT | Mod: CPTII,S$GLB,, | Performed by: OBSTETRICS & GYNECOLOGY

## 2023-08-22 PROCEDURE — 87591 N.GONORRHOEAE DNA AMP PROB: CPT | Performed by: OBSTETRICS & GYNECOLOGY

## 2023-08-22 PROCEDURE — 88175 CYTOPATH C/V AUTO FLUID REDO: CPT | Performed by: OBSTETRICS & GYNECOLOGY

## 2023-08-22 PROCEDURE — 87088 URINE BACTERIA CULTURE: CPT | Performed by: OBSTETRICS & GYNECOLOGY

## 2023-08-22 PROCEDURE — 87086 URINE CULTURE/COLONY COUNT: CPT | Performed by: OBSTETRICS & GYNECOLOGY

## 2023-08-22 PROCEDURE — 99999 PR PBB SHADOW E&M-EST. PATIENT-LVL III: ICD-10-PCS | Mod: PBBFAC,,, | Performed by: OBSTETRICS & GYNECOLOGY

## 2023-08-22 PROCEDURE — 99395 PREV VISIT EST AGE 18-39: CPT | Mod: S$GLB,,, | Performed by: OBSTETRICS & GYNECOLOGY

## 2023-08-22 PROCEDURE — 81002 URINALYSIS NONAUTO W/O SCOPE: CPT | Mod: S$GLB,,, | Performed by: OBSTETRICS & GYNECOLOGY

## 2023-08-22 PROCEDURE — 81002 POCT URINE DIPSTICK WITHOUT MICROSCOPE: ICD-10-PCS | Mod: S$GLB,,, | Performed by: OBSTETRICS & GYNECOLOGY

## 2023-08-22 PROCEDURE — 81514 NFCT DS BV&VAGINITIS DNA ALG: CPT | Performed by: OBSTETRICS & GYNECOLOGY

## 2023-08-22 PROCEDURE — 3078F PR MOST RECENT DIASTOLIC BLOOD PRESSURE < 80 MM HG: ICD-10-PCS | Mod: CPTII,S$GLB,, | Performed by: OBSTETRICS & GYNECOLOGY

## 2023-08-22 PROCEDURE — 1159F MED LIST DOCD IN RCRD: CPT | Mod: CPTII,S$GLB,, | Performed by: OBSTETRICS & GYNECOLOGY

## 2023-08-22 PROCEDURE — 1160F RVW MEDS BY RX/DR IN RCRD: CPT | Mod: CPTII,S$GLB,, | Performed by: OBSTETRICS & GYNECOLOGY

## 2023-08-22 PROCEDURE — 3078F DIAST BP <80 MM HG: CPT | Mod: CPTII,S$GLB,, | Performed by: OBSTETRICS & GYNECOLOGY

## 2023-08-22 PROCEDURE — 99395 PR PREVENTIVE VISIT,EST,18-39: ICD-10-PCS | Mod: S$GLB,,, | Performed by: OBSTETRICS & GYNECOLOGY

## 2023-08-22 PROCEDURE — 99999 PR PBB SHADOW E&M-EST. PATIENT-LVL III: CPT | Mod: PBBFAC,,, | Performed by: OBSTETRICS & GYNECOLOGY

## 2023-08-22 PROCEDURE — 3074F PR MOST RECENT SYSTOLIC BLOOD PRESSURE < 130 MM HG: ICD-10-PCS | Mod: CPTII,S$GLB,, | Performed by: OBSTETRICS & GYNECOLOGY

## 2023-08-22 PROCEDURE — 3008F PR BODY MASS INDEX (BMI) DOCUMENTED: ICD-10-PCS | Mod: CPTII,S$GLB,, | Performed by: OBSTETRICS & GYNECOLOGY

## 2023-08-22 RX ORDER — NITROFURANTOIN 25; 75 MG/1; MG/1
CAPSULE ORAL
Qty: 30 CAPSULE | Refills: 1 | Status: SHIPPED | OUTPATIENT
Start: 2023-08-22 | End: 2024-01-08 | Stop reason: SDUPTHER

## 2023-08-22 NOTE — PROGRESS NOTES
SUBJECTIVE:     Chief Complaint: Well Woman and STD CHECK       History of Present Illness:  Annual Exam  Patient presents for annual exam.   She c/o recurrent UTIs today. Has been going to [facility]. Has tested positive for ureaplasma a few times. Did treatment in July. Stil having some dysuria and dyspareunia. Interested in post-coital prophylaxis. Does not always urinate after sex.   LMP: none with IUD  She denies any vd, vb,  depression, anxiety.  Last pap was in unsure and was neg per patient. HPV na.  Birth Control: Mirena 2020  wants STD testing. Has new sexual partner.     GYN screening history:  denies  Mammogram history: none  Colonoscopy history: none  Dexa history: none    FH:   Breast cancer: mother, BRCA neg  Colon cancer: none  Ovarian cancer: none    Review of patient's allergies indicates:  No Known Allergies    Past Medical History:   Diagnosis Date    Asthma     Mental disorder      Past Surgical History:   Procedure Laterality Date    WISDOM TOOTH EXTRACTION       OB History          0    Para   0    Term   0       0    AB   0    Living   0         SAB   0    IAB   0    Ectopic   0    Multiple   0    Live Births   0               Family History   Problem Relation Age of Onset    Breast cancer Mother     Breast cancer Maternal Aunt     Melanoma Maternal Grandmother      Social History     Tobacco Use    Smoking status: Some Days     Current packs/day: 0.00     Types: Vaping with nicotine     Start date: 3/8/2022    Smokeless tobacco: Current   Substance Use Topics    Alcohol use: Yes     Alcohol/week: 6.0 standard drinks of alcohol     Types: 6 Drinks containing 0.5 oz of alcohol per week    Drug use: Never       Current Outpatient Medications   Medication Sig    albuterol (ACCUNEB) 0.63 mg/3 mL Nebu Take 0.63 mg by nebulization every 6 (six) hours as needed. Rescue    ciclesonide (ALVESCO) 160 mcg/actuation HFAA Inhale 1 puff into the lungs. Controller    citalopram  (CELEXA) 10 MG tablet Take 10 mg by mouth once daily. Takes 2 10s    clindamycin phosphate 1% (CLINDAGEL) 1 % gel Apply to affected areas of acne on face twice a day after cleansing.    tacrolimus (PROTOPIC) 0.1 % ointment Apply topically 2 (two) times daily.    tretinoin (RETIN-A) 0.025 % cream Apply pea sized amount to entire face nightly. Wash off in am and apply sunscreen.    nitrofurantoin, macrocrystal-monohydrate, (MACROBID) 100 MG capsule Take one tab PO PRN intercourse.    triamcinolone acetonide 0.1% (KENALOG) 0.1 % cream Use bid (Patient not taking: Reported on 8/22/2023)     No current facility-administered medications for this visit.       Review of Systems:  GENERAL: No fever, chills, fatigability or weight loss.  CARDIOVASCULAR: No chest pain. No palpitations.  RESPIRATORY: No SOB, no wheezing.  BREAST: Denies pain. No lumps. No discharge.  VULVAR: No pain, no lesions and no itching.  VAGINAL: No relaxation, no itching, no discharge, no abnormal bleeding and no lesions.  ABDOMEN: No abdominal pain. Denies nausea. Denies vomiting. No diarrhea. No constipation  URINARY: No incontinence, no nocturia, no frequency and + dysuria.  NEUROLOGICAL: No headaches. No vision changes.       OBJECTIVE:     Vitals:    08/22/23 0850   BP: 106/71       Patient verbally consents to pelvic and breast exams.  Physical Exam:  Gen: NAD, well developed, well-nourished  HEENT: Normocephalic, atraumatic  Eyes: EOM nl, conjuntivae normal  Neck: ROM normal, no thyromegaly  Respiratory: Effort normal   Abd: soft, nontender, no masses palpated  Breast: Normal bilaterally, no masses, lesions or tenderness. No nipple discharge on expression, no lymphadenopathy bilaterally.  SSE:  Vulva: no lesions or rashes  Cervix: No lesions noted, nonfriable, no vaginal discharge or vaginal bleeding noted, IUD strings seen  BME:   Cervix: No CMT  Adnexa: nl bilaterally, no masses or fullness palpated  Uterus: normal,  nonenlarged  Musculoskeletal: normal ROM  Neuro: alert, AAOx3  Skin: warm and dry  Psych: mood/affect nl, behavior normal, judgement normal, thought content normal        ASSESSMENT:       ICD-10-CM ICD-9-CM    1. Encounter for annual routine gynecological examination  Z01.419 V72.31 Liquid-Based Pap Smear, Screening      2. STD exposure  Z20.2 V01.6 C. trachomatis/N. gonorrhoeae by AMP DNA      Vaginosis Screen by DNA Probe      CANCELED: HIV 1/2 Ag/Ab (4th Gen)      CANCELED: RPR      CANCELED: Hepatitis Panel, Acute      3. Vaginal discharge  N89.8 623.5 Vaginosis Screen by DNA Probe      4. Dysuria  R30.0 788.1 POCT URINE DIPSTICK WITHOUT MICROSCOPE      Urine culture      5. Recurrent UTI  N39.0 599.0       6. Family history of breast cancer in mother  Z80.3 V16.3              Plan:      eHidi was seen today for well woman and std check.    Diagnoses and all orders for this visit:    Encounter for annual routine gynecological examination  -     Liquid-Based Pap Smear, Screening    STD exposure  -     C. trachomatis/N. gonorrhoeae by AMP DNA  -     Cancel: HIV 1/2 Ag/Ab (4th Gen); Future  -     Cancel: RPR; Future  -     Cancel: Hepatitis Panel, Acute; Future  -     Vaginosis Screen by DNA Probe    Vaginal discharge  -     Vaginosis Screen by DNA Probe    Dysuria  -     POCT URINE DIPSTICK WITHOUT MICROSCOPE  -     Urine culture    Recurrent UTI    Family history of breast cancer in mother    Other orders  -     nitrofurantoin, macrocrystal-monohydrate, (MACROBID) 100 MG capsule; Take one tab PO PRN intercourse.        Orders Placed This Encounter   Procedures    C. trachomatis/N. gonorrhoeae by AMP DNA    Vaginosis Screen by DNA Probe    Urine culture    POCT URINE DIPSTICK WITHOUT MICROSCOPE     Will need early Breast cancer screening.   Follow up in one year for annual, or prn.    Julie R Jeansonne

## 2023-08-23 LAB
BACTERIAL VAGINOSIS DNA: NEGATIVE
C TRACH DNA SPEC QL NAA+PROBE: NOT DETECTED
CANDIDA GLABRATA DNA: NEGATIVE
CANDIDA KRUSEI DNA: NEGATIVE
CANDIDA RRNA VAG QL PROBE: NEGATIVE
N GONORRHOEA DNA SPEC QL NAA+PROBE: NOT DETECTED
T VAGINALIS RRNA GENITAL QL PROBE: NEGATIVE

## 2023-08-24 LAB — BACTERIA UR CULT: ABNORMAL

## 2023-08-29 ENCOUNTER — PATIENT MESSAGE (OUTPATIENT)
Dept: OBSTETRICS AND GYNECOLOGY | Facility: CLINIC | Age: 23
End: 2023-08-29
Payer: COMMERCIAL

## 2023-08-29 LAB
CLINICAL INFO: ABNORMAL
CYTO CVX: ABNORMAL
CYTOLOGIST CVX/VAG CYTO: ABNORMAL
CYTOLOGIST CVX/VAG CYTO: ABNORMAL
CYTOLOGY CMNT CVX/VAG CYTO-IMP: ABNORMAL
CYTOLOGY PAP THIN PREP EXPLANATION: ABNORMAL
DATE OF PREVIOUS PAP: ABNORMAL
DATE PREVIOUS BX: NO
GEN CATEG CVX/VAG CYTO-IMP: ABNORMAL
LMP START DATE: 0
MICROORGANISM CVX/VAG CYTO: ABNORMAL
PATHOLOGIST CVX/VAG CYTO: ABNORMAL
SERVICE CMNT-IMP: ABNORMAL
SPECIMEN SOURCE CVX/VAG CYTO: ABNORMAL
STAT OF ADQ CVX/VAG CYTO-IMP: ABNORMAL

## 2023-10-16 ENCOUNTER — OFFICE VISIT (OUTPATIENT)
Dept: DERMATOLOGY | Facility: CLINIC | Age: 23
End: 2023-10-16
Payer: COMMERCIAL

## 2023-10-16 DIAGNOSIS — L91.0 HYPERTROPHIC SCAR: ICD-10-CM

## 2023-10-16 DIAGNOSIS — L70.0 ACNE VULGARIS: ICD-10-CM

## 2023-10-16 DIAGNOSIS — D22.39 FIBROUS PAPULE OF NOSE: Primary | ICD-10-CM

## 2023-10-16 PROCEDURE — 17110 PR DESTRUCTION BENIGN LESIONS UP TO 14: ICD-10-PCS | Mod: 59,S$GLB,, | Performed by: DERMATOLOGY

## 2023-10-16 PROCEDURE — 99999 PR PBB SHADOW E&M-EST. PATIENT-LVL III: ICD-10-PCS | Mod: PBBFAC,,, | Performed by: DERMATOLOGY

## 2023-10-16 PROCEDURE — 11900 PR INJECTION INTO SKIN LESIONS, UP TO 7: ICD-10-PCS | Mod: S$GLB,,, | Performed by: DERMATOLOGY

## 2023-10-16 PROCEDURE — 11900 INJECT SKIN LESIONS </W 7: CPT | Mod: S$GLB,,, | Performed by: DERMATOLOGY

## 2023-10-16 PROCEDURE — 99999 PR PBB SHADOW E&M-EST. PATIENT-LVL III: CPT | Mod: PBBFAC,,, | Performed by: DERMATOLOGY

## 2023-10-16 PROCEDURE — 17110 DESTRUCTION B9 LES UP TO 14: CPT | Mod: 59,S$GLB,, | Performed by: DERMATOLOGY

## 2023-10-16 PROCEDURE — 99499 NO LOS: ICD-10-PCS | Mod: S$GLB,,, | Performed by: DERMATOLOGY

## 2023-10-16 PROCEDURE — 99499 UNLISTED E&M SERVICE: CPT | Mod: S$GLB,,, | Performed by: DERMATOLOGY

## 2023-10-16 NOTE — PATIENT INSTRUCTIONS
CRYOSURGERY      Your doctor has used a method called cryosurgery to treat your skin condition. Cryosurgery refers to the use of very cold substances to treat a variety of skin conditions such as warts, pre-skin cancers, molluscum contagiosum, sun spots, and several benign growths. The substance we use in cryosurgery is liquid nitrogen and is so cold (-195 degrees Celsius) that is burns when administered.     Following treatment in the office, the skin may immediately burn and become red. You may find the area around the lesion is affected as well. It is sometimes necessary to treat not only the lesion, but a small area of the surrounding normal skin to achieve a good response.     A blister, and even a blood filled blister, may form after treatment.   This is a normal response. If the blister is painful, it is acceptable to sterilize a needle and with rubbing alcohol and gently pop the blister. It is important that you gently wash the area with soap and warm water as the blister fluid may contain wart virus if a wart was treated. Do no remove the roof of the blister.     The area treated can take anywhere from 1-3 weeks to heal. Healing time depends on the kind skin lesion treated, the location, and how aggressively the lesion was treated. It is recommended that the areas treated are covered with Vaseline or bacitracin ointment and a band-aid. If a band-aid is not practical, just ointment applied several times per day will do. Keeping these areas moist will speed the healing time.    Treatment with liquid nitrogen can leave a scar. In dark skin, it may be a light or dark scar, in light skin it may be a white or pink scar. These will generally fade with time, but may never go away completely.     If you have any concerns after your treatment, please feel free to call the office.       [address] [place] [address]/ [phone] FAX/ [email or web address]     Sun Protection      The [facility] would like to remind you of the importance of sun protection all year round and particularly during the summer when the suns rays are the strongest. It has been proven that both acute and chronic sun exposure damages our cells and leads to skin cancer. Beyond skin cancer, the sun causes 90% of the symptoms of premature skin aging, including wrinkles, lentigines (brown spots), and thin, easily bruised skin. Proper sun protection can help prevent these unwanted conditions.    Many patients report that they dont go in the sun. It has been shown that the average person receives 18 hours of incidental sun exposure per week during activities such as walking through parking lots, driving, or sitting next to windows. This accumulates to several bad sunburns per year!    In choosing sunscreen, you want one that protects against both UVA and UVB rays (broad spectrum). It is recommended that you use one of SPF 30 or higher. It is important to apply the sunscreen about 20 minutes prior to sun exposure. Most sunscreens are chemical sunscreens and a reaction must take place in the skin so that they are effective. If they are applied and then you are immediately exposed to the sun or start sweating, this reaction has not had time to take place and you are therefore unprotected. Sunscreen needs to be reapplied every 2 hours if you are participating in water sports or sweating. We recommend Elta MD or CeraVe sunscreens for daily use; however there are many options and it is most important for you to find one that you will use on a consistent basis.    If you have sensitive skin, you may do best with a sunscreen that contains only physical blockers in the active ingredient section. The only physical blockers available in the [place] currently are titanium dioxide or zinc oxide. These are typically thicker and harder to apply, however they afford very good protection. Neutrogena Sensitive Skin, Blue Lizard  Sensitive Skin (pink top) or Neutrogena Pure and Free are popular ones.     Aside from sunscreen, clothes with UV protection (UPF), wide brimmed hats, and sunglasses are other means of sun protection that we recommend.      Based on a recent study (6/2021) and out of an abundance of caution, we are recommending that you AVOID the following sunscreens as they may contain the carcinogen, benzene:    Spray and gel sunscreens  Any [employer] or [employer] brands as well as Max Block and TopCare brands   Neutrogena Ultra Sheer Dry-touch Water Resistant Sunscreen LOTION SPF 70   Neutrogena Sheer Zinc Dry-touch Face Sunscreen LOTION SPF 50   5.   Aveeno Baby Continuous Protection Sensitive Skin Sunscreen LOTION - Broad Spectrum SPF 50    Please note that Benzene is not an ingredient or the degradation product of any ingredient in any sunscreen. This study suggested that the findings are a result of contamination in the manufacturing process. At this point, we don't know how effectively Benzene gets through the skin, if it gets absorbed systemically, and what effects it may have.     We do know that ultraviolet radiation is a well-established carcinogen. Please use daily sun protection/avoidance and use of at least SPF 30, broad-spectrum sunscreen not listed above.                       [facility]  [facility] - DERMATOLOGY [address]  [facility] [address]  Dept: [phone]  Dept Fax: [phone]

## 2023-10-16 NOTE — PROGRESS NOTES
Subjective:      Patient ID:  Heidi Gonzalez is a 23 y.o. female who presents for   Chief Complaint   Patient presents with    Spot     Follow-up for spot on nose     Acne     Face      Spot    Acne    Last visit in May, was prescribed tretinoin 0.025% cream and topical clindamycin for acne. Doing well with this regimen. No problems with dryness, irritation or breakouts.    Bump on nose improved a little with tretinoin, but still present. Not symptomatic.    Developed a red, tender bump at shave bx site on L forearm (SK).      Review of Systems   Constitutional:  Negative for fever and chills.   Respiratory:  Negative for cough and shortness of breath.    Gastrointestinal:  Negative for nausea and vomiting.   Musculoskeletal:  Negative for joint swelling and arthralgias.   Skin:  Negative for daily sunscreen use, activity-related sunscreen use, recent sunburn and wears hat.   All other systems reviewed and are negative.  Hematologic/Lymphatic: Does not bruise/bleed easily.       Objective:   Physical Exam   Constitutional: She appears well-developed and well-nourished.   Neurological: She is alert and oriented to person, place, and time.   Psychiatric: She has a normal mood and affect.   Skin:   Areas Examined (abnormalities noted in diagram):   Head / Face Inspection Performed  LUE Inspection Performed                 Diagram Legend     Erythematous scaling macule/papule c/w actinic keratosis       Vascular papule c/w angioma      Pigmented verrucoid papule/plaque c/w seborrheic keratosis      Yellow umbilicated papule c/w sebaceous hyperplasia      Irregularly shaped tan macule c/w lentigo     1-2 mm smooth white papules consistent with Milia      Movable subcutaneous cyst with punctum c/w epidermal inclusion cyst      Subcutaneous movable cyst c/w pilar cyst      Firm pink to brown papule c/w dermatofibroma      Pedunculated fleshy papule(s) c/w skin tag(s)      Evenly pigmented macule c/w junctional  nevus     Mildly variegated pigmented, slightly irregular-bordered macule c/w mildly atypical nevus      Flesh colored to evenly pigmented papule c/w intradermal nevus       Pink pearly papule/plaque c/w basal cell carcinoma      Erythematous hyperkeratotic cursted plaque c/w SCC      Surgical scar with no sign of skin cancer recurrence      Open and closed comedones      Inflammatory papules and pustules      Verrucoid papule consistent consistent with wart     Erythematous eczematous patches and plaques     Dystrophic onycholytic nail with subungual debris c/w onychomycosis     Umbilicated papule    Erythematous-base heme-crusted tan verrucoid plaque consistent with inflamed seborrheic keratosis     Erythematous Silvery Scaling Plaque c/w Psoriasis     See annotation      Assessment / Plan:        Fibrous papule of nose  Cryosurgery procedure note:  Verbal consent from the patient is obtained including, but not limited to, risk of hypopigmentation/hyperpigmentation, scar, recurrence of lesion. Liquid nitrogen cryosurgery is applied to 1 lesion to produce a freeze injury. The patient is aware that blisters may form and is instructed on wound care with gentle cleansing and use of vaseline ointment to keep moist until healed. The patient is supplied a handout on cryosurgery and is instructed to call if lesions do not completely resolve.    Acne vulgaris  Doing well with current regimen.     Hypertrophic scar  Intralesional Kenalog 10mg/cc (0.1 cc total) injected into 1 lesion on the L forearm today after obtaining verbal consent including risk of surrounding hypopigmentation. Patient tolerated procedure well.    Units: 1  NDC for Kenalog 10mg/cc:  1709-2635-82      Follow up in about 1 year (around 10/16/2024) for skin check or sooner for any concerns.

## 2024-01-08 NOTE — TELEPHONE ENCOUNTER
Refill Routing Note   Medication(s) are not appropriate for processing by [facility] for the following reason(s):      Medication outside of protocol    [facility] action(s):  Route Care Due:  None identified            Appointments  past 12m or future 3m with PCP    Date Provider   Last Visit   8/22/2023 Jeansonne, Julie R., MD   Next Visit   Visit date not found Jeansonne, Julie R., MD   ED visits in past 90 days: 0        Note composed:10:31 AM 01/08/2024

## 2024-01-09 RX ORDER — NITROFURANTOIN 25; 75 MG/1; MG/1
CAPSULE ORAL
Qty: 30 CAPSULE | Refills: 1 | Status: SHIPPED | OUTPATIENT
Start: 2024-01-09

## 2024-03-13 ENCOUNTER — TELEPHONE (OUTPATIENT)
Dept: INTERNAL MEDICINE | Facility: CLINIC | Age: 24
End: 2024-03-13

## 2024-03-13 ENCOUNTER — OFFICE VISIT (OUTPATIENT)
Dept: INTERNAL MEDICINE | Facility: CLINIC | Age: 24
End: 2024-03-13
Payer: COMMERCIAL

## 2024-03-13 DIAGNOSIS — F41.8 DEPRESSION WITH ANXIETY: Primary | ICD-10-CM

## 2024-03-13 PROCEDURE — 1159F MED LIST DOCD IN RCRD: CPT | Mod: CPTII,95,, | Performed by: NURSE PRACTITIONER

## 2024-03-13 PROCEDURE — 99213 OFFICE O/P EST LOW 20 MIN: CPT | Mod: 95,,, | Performed by: NURSE PRACTITIONER

## 2024-03-13 PROCEDURE — 1160F RVW MEDS BY RX/DR IN RCRD: CPT | Mod: CPTII,95,, | Performed by: NURSE PRACTITIONER

## 2024-03-13 RX ORDER — FLUOXETINE 10 MG/1
10 CAPSULE ORAL DAILY
Qty: 30 CAPSULE | Refills: 2 | Status: SHIPPED | OUTPATIENT
Start: 2024-03-13 | End: 2024-04-02 | Stop reason: SDUPTHER

## 2024-03-13 NOTE — PROGRESS NOTES
Subjective:       Patient ID: Heidi Gonzalez is a 23 y.o. female.    Chief Complaint: Anxiety and Depression    Visit type: audiovisual    Heidi Gonzalez is a 23 y.o. female who presents today to resume medication for anxiety/depression. Patient reports physical manifestations of anxiety including picking/pulling of hair. She was on Celexa in the past but did not feel it worked for her. Interested in trying Prozac.     The patient location is: [place]    Answers submitted by the patient for this visit:  Review of Systems Questionnaire (Submitted on 3/13/2024)  activity change: No  unexpected weight change: No  rhinorrhea: No  trouble swallowing: No  visual disturbance: No  chest tightness: No  polyuria: No  difficulty urinating: No  menstrual problem: No  joint swelling: No  arthralgias: No  confusion: No  dysphoric mood: No    Past Medical History:   Diagnosis Date    Asthma     Mental disorder      Past Surgical History:   Procedure Laterality Date    WISDOM TOOTH EXTRACTION       Social History     Socioeconomic History    Marital status: Unknown   Tobacco Use    Smoking status: Some Days     Types: Vaping with nicotine, Cigarettes     Start date: 3/8/2022    Smokeless tobacco: Current   Substance and Sexual Activity    Alcohol use: Yes     Alcohol/week: 6.0 standard drinks of alcohol     Types: 6 Drinks containing 0.5 oz of alcohol per week    Drug use: Never    Sexual activity: Yes     Partners: Male     Birth control/protection: I.U.D.     Social Determinants of Health     Financial Resource Strain: Low Risk  (3/13/2024)    Overall Financial Resource Strain (CARDIA)     Difficulty of Paying Living Expenses: Not hard at all   Food Insecurity: No Food Insecurity (3/13/2024)    Hunger Vital Sign     Worried About Running Out of Food in the Last Year: Never true     Ran Out of Food in the Last Year: Never true   Transportation Needs: No Transportation Needs (3/13/2024)    PRAPARE - Transportation      Lack of Transportation (Medical): No     Lack of Transportation (Non-Medical): No   Physical Activity: Sufficiently Active (3/13/2024)    Exercise Vital Sign     Days of Exercise per Week: 3 days     Minutes of Exercise per Session: 60 min   Stress: Stress Concern Present (3/13/2024)    [employer] [place] [employer] - Occupational Stress Questionnaire     Feeling of Stress : Rather much   Social Connections: Unknown (3/13/2024)    Social Connection and Isolation Panel [NHANES]     Frequency of Communication with Friends and Family: More than three times a week     Frequency of Social Gatherings with Friends and Family: More than three times a week     Active Member of Clubs or Organizations: No     Attends Club or Organization Meetings: Never     Marital Status: Never    Housing Stability: Unknown (3/13/2024)    Housing Stability Vital Sign     Unable to Pay for Housing in the Last Year: No     Unstable Housing in the Last Year: No     Family History   Problem Relation Age of Onset    Breast cancer Mother     Cancer Mother     Breast cancer Maternal Aunt     Melanoma Maternal Grandmother     Asthma Father     Stroke Paternal Grandfather     Depression Maternal Uncle     Mental illness Sister        Review of patient's allergies indicates:   Allergen Reactions    Cat/feline products Itching, Shortness Of Breath and Swelling       Medication List with Changes/Refills   New Medications    FLUOXETINE 10 MG CAPSULE    Take 1 capsule (10 mg total) by mouth once daily.   Current Medications    ALBUTEROL (ACCUNEB) 0.63 MG/3 ML NEBU    Take 0.63 mg by nebulization every 6 (six) hours as needed. Rescue    CICLESONIDE (ALVESCO) 160 MCG/ACTUATION HFAA    Inhale 1 puff into the lungs. Controller    CLINDAMYCIN PHOSPHATE 1% (CLINDAGEL) 1 % GEL    Apply to affected areas of acne on face twice a day after cleansing.    NITROFURANTOIN, MACROCRYSTAL-MONOHYDRATE, (MACROBID) 100 MG CAPSULE    Take one tab PO PRN  intercourse.    TACROLIMUS (PROTOPIC) 0.1 % OINTMENT    Apply topically 2 (two) times daily.    TRETINOIN (RETIN-A) 0.025 % CREAM    Apply pea sized amount to entire face nightly. Wash off in am and apply sunscreen.    TRIAMCINOLONE ACETONIDE 0.1% (KENALOG) 0.1 % CREAM    Use bid   Discontinued Medications    CITALOPRAM (CELEXA) 10 MG TABLET    Take 10 mg by mouth once daily. Takes 2 10s       Review of Systems   HENT:  Negative for hearing loss.    Eyes:  Negative for discharge.   Respiratory:  Negative for wheezing.    Cardiovascular:  Negative for chest pain and palpitations.   Gastrointestinal:  Negative for blood in stool, constipation, diarrhea and vomiting.   Genitourinary:  Negative for dysuria and hematuria.   Musculoskeletal:  Negative for neck pain.   Neurological:  Negative for weakness and headaches.   Endo/Heme/Allergies:  Negative for polydipsia.   Psychiatric/Behavioral:  Positive for depression. The patient is nervous/anxious.        Objective:   There were no vitals taken for this visit.    Physical Exam  Vitals reviewed: Exam Limited due to Video Consultation.   Constitutional:       General: She is not in acute distress.  HENT:      Head: Normocephalic.   Neurological:      Mental Status: She is alert.       Assessment and Plan:       1. Depression with anxiety  - FLUoxetine 10 MG capsule; Take 1 capsule (10 mg total) by mouth once daily.  Dispense: 30 capsule; Refill: 2  - Ambulatory referral/consult to Psychiatry; Future    Patient advised to follow up with Psychiatry and to get established with MD.       Face to Face time with patient: 7  12 minutes of total time spent on the encounter, which includes face to face time and non-face to face time preparing to see the patient (eg, review of tests), Obtaining and/or reviewing separately obtained history, Documenting clinical information in the electronic or other health record, Independently interpreting results (not separately reported) and  communicating results to the patient/family/caregiver, or Care coordination (not separately reported).     Each patient to whom he or she provides medical services by telemedicine is:  (1) informed of the relationship between the physician and patient and the respective role of any other health care provider with respect to management of the patient; and (2) notified that he or she may decline to receive medical services by telemedicine and may withdraw from such care at any time.

## 2024-03-13 NOTE — TELEPHONE ENCOUNTER
The patient was called. No answer, message left to call scheduling for an appointment to established care.  Scheduling number provided.

## 2024-03-27 ENCOUNTER — PATIENT MESSAGE (OUTPATIENT)
Dept: INTERNAL MEDICINE | Facility: CLINIC | Age: 24
End: 2024-03-27
Payer: COMMERCIAL

## 2024-03-27 DIAGNOSIS — F41.8 DEPRESSION WITH ANXIETY: ICD-10-CM

## 2024-04-02 RX ORDER — ALBUTEROL SULFATE 90 UG/1
2 AEROSOL, METERED RESPIRATORY (INHALATION) EVERY 6 HOURS PRN
COMMUNITY
Start: 2024-03-13 | End: 2024-04-02 | Stop reason: SDUPTHER

## 2024-04-02 RX ORDER — ALBUTEROL SULFATE 90 UG/1
2 AEROSOL, METERED RESPIRATORY (INHALATION) EVERY 6 HOURS PRN
Qty: 18 G | Refills: 1 | Status: SHIPPED | OUTPATIENT
Start: 2024-04-02 | End: 2024-04-03

## 2024-04-02 RX ORDER — FLUOXETINE HYDROCHLORIDE 20 MG/1
20 CAPSULE ORAL DAILY
Qty: 90 CAPSULE | Refills: 3 | Status: SHIPPED | OUTPATIENT
Start: 2024-04-02 | End: 2025-04-02

## 2024-04-03 RX ORDER — LEVALBUTEROL TARTRATE 45 UG/1
1-2 AEROSOL, METERED ORAL EVERY 6 HOURS PRN
Qty: 15 G | Refills: 0 | Status: SHIPPED | OUTPATIENT
Start: 2024-04-03

## 2024-05-21 ENCOUNTER — PATIENT MESSAGE (OUTPATIENT)
Dept: DERMATOLOGY | Facility: CLINIC | Age: 24
End: 2024-05-21
Payer: COMMERCIAL

## 2024-06-10 ENCOUNTER — OFFICE VISIT (OUTPATIENT)
Dept: DERMATOLOGY | Facility: CLINIC | Age: 24
End: 2024-06-10
Payer: COMMERCIAL

## 2024-06-10 DIAGNOSIS — L81.0 POST-INFLAMMATORY HYPERPIGMENTATION: ICD-10-CM

## 2024-06-10 DIAGNOSIS — L23.7 PHYTOPHOTODERMATITIS: Primary | ICD-10-CM

## 2024-06-10 PROCEDURE — 99999 PR PBB SHADOW E&M-EST. PATIENT-LVL III: CPT | Mod: PBBFAC,,, | Performed by: DERMATOLOGY

## 2024-06-10 PROCEDURE — 99213 OFFICE O/P EST LOW 20 MIN: CPT | Mod: S$GLB,,, | Performed by: DERMATOLOGY

## 2024-06-10 PROCEDURE — 1159F MED LIST DOCD IN RCRD: CPT | Mod: CPTII,S$GLB,, | Performed by: DERMATOLOGY

## 2024-06-10 PROCEDURE — 1160F RVW MEDS BY RX/DR IN RCRD: CPT | Mod: CPTII,S$GLB,, | Performed by: DERMATOLOGY

## 2024-06-10 NOTE — PROGRESS NOTES
Subjective:      Patient ID:  Heidi Gonzalez is a 23 y.o. female who presents for   Chief Complaint   Patient presents with    Spot     Arms      Spot - Initial  Affected locations: right arm and left arm  Duration: 1 month  Signs / symptoms: blistering  Aggravated by: nothing  Relieving factors/Treatments tried: nothing    Rash started May 7th. Was squeezing hermelinda and making lemonade prior.      Review of Systems   Constitutional: Negative.    HENT: Negative.     Respiratory: Negative.     Musculoskeletal: Negative.        Objective:   Physical Exam   Constitutional: She appears well-developed and well-nourished.   Neurological: She is alert and oriented to person, place, and time.   Psychiatric: She has a normal mood and affect.   Skin:   Areas Examined (abnormalities noted in diagram):   RUE Inspected  LUE Inspection Performed       Photo of arm shows few small blisters on non-inflamed base     Diagram Legend     Erythematous scaling macule/papule c/w actinic keratosis       Vascular papule c/w angioma      Pigmented verrucoid papule/plaque c/w seborrheic keratosis      Yellow umbilicated papule c/w sebaceous hyperplasia      Irregularly shaped tan macule c/w lentigo     1-2 mm smooth white papules consistent with Milia      Movable subcutaneous cyst with punctum c/w epidermal inclusion cyst      Subcutaneous movable cyst c/w pilar cyst      Firm pink to brown papule c/w dermatofibroma      Pedunculated fleshy papule(s) c/w skin tag(s)      Evenly pigmented macule c/w junctional nevus     Mildly variegated pigmented, slightly irregular-bordered macule c/w mildly atypical nevus      Flesh colored to evenly pigmented papule c/w intradermal nevus       Pink pearly papule/plaque c/w basal cell carcinoma      Erythematous hyperkeratotic cursted plaque c/w SCC      Surgical scar with no sign of skin cancer recurrence      Open and closed comedones      Inflammatory papules and pustules      Verrucoid papule  consistent consistent with wart     Erythematous eczematous patches and plaques     Dystrophic onycholytic nail with subungual debris c/w onychomycosis     Umbilicated papule    Erythematous-base heme-crusted tan verrucoid plaque consistent with inflamed seborrheic keratosis     Erythematous Silvery Scaling Plaque c/w Psoriasis     See annotation      Assessment / Plan:        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  This is normal discoloration of the skin after an inflammatory process has resolved. It may take months to years to fade.  Patient instructed in importance of daily broad spectrum sunscreen use with spf at least 30. Sun avoidance and topical protection/protective clothing discussed.    RTC prn

## 2024-06-13 NOTE — PATIENT INSTRUCTIONS

## 2024-08-05 ENCOUNTER — OFFICE VISIT (OUTPATIENT)
Dept: OBSTETRICS AND GYNECOLOGY | Facility: CLINIC | Age: 24
End: 2024-08-05
Payer: COMMERCIAL

## 2024-08-05 VITALS
SYSTOLIC BLOOD PRESSURE: 114 MMHG | DIASTOLIC BLOOD PRESSURE: 65 MMHG | WEIGHT: 116.88 LBS | BODY MASS INDEX: 20.71 KG/M2 | HEIGHT: 63 IN

## 2024-08-05 DIAGNOSIS — Z01.419 ENCOUNTER FOR ANNUAL ROUTINE GYNECOLOGICAL EXAMINATION: Primary | ICD-10-CM

## 2024-08-05 LAB
C TRACH DNA SPEC QL NAA+PROBE: NOT DETECTED
N GONORRHOEA DNA SPEC QL NAA+PROBE: NOT DETECTED

## 2024-08-05 PROCEDURE — 99395 PREV VISIT EST AGE 18-39: CPT | Mod: S$GLB,,, | Performed by: OBSTETRICS & GYNECOLOGY

## 2024-08-05 PROCEDURE — 3008F BODY MASS INDEX DOCD: CPT | Mod: CPTII,S$GLB,, | Performed by: OBSTETRICS & GYNECOLOGY

## 2024-08-05 PROCEDURE — 3078F DIAST BP <80 MM HG: CPT | Mod: CPTII,S$GLB,, | Performed by: OBSTETRICS & GYNECOLOGY

## 2024-08-05 PROCEDURE — 1159F MED LIST DOCD IN RCRD: CPT | Mod: CPTII,S$GLB,, | Performed by: OBSTETRICS & GYNECOLOGY

## 2024-08-05 PROCEDURE — 99999 PR PBB SHADOW E&M-EST. PATIENT-LVL III: CPT | Mod: PBBFAC,,, | Performed by: OBSTETRICS & GYNECOLOGY

## 2024-08-05 PROCEDURE — 87591 N.GONORRHOEAE DNA AMP PROB: CPT | Performed by: OBSTETRICS & GYNECOLOGY

## 2024-08-05 PROCEDURE — 99459 PELVIC EXAMINATION: CPT | Mod: S$GLB,,, | Performed by: OBSTETRICS & GYNECOLOGY

## 2024-08-05 PROCEDURE — 3074F SYST BP LT 130 MM HG: CPT | Mod: CPTII,S$GLB,, | Performed by: OBSTETRICS & GYNECOLOGY

## 2024-08-05 PROCEDURE — 1160F RVW MEDS BY RX/DR IN RCRD: CPT | Mod: CPTII,S$GLB,, | Performed by: OBSTETRICS & GYNECOLOGY

## 2024-08-05 PROCEDURE — 87491 CHLMYD TRACH DNA AMP PROBE: CPT | Performed by: OBSTETRICS & GYNECOLOGY

## 2024-08-05 RX ORDER — NITROFURANTOIN 25; 75 MG/1; MG/1
100 CAPSULE ORAL ONCE AS NEEDED
Qty: 15 CAPSULE | Refills: 0 | Status: SHIPPED | OUTPATIENT
Start: 2024-08-05